# Patient Record
Sex: FEMALE
[De-identification: names, ages, dates, MRNs, and addresses within clinical notes are randomized per-mention and may not be internally consistent; named-entity substitution may affect disease eponyms.]

---

## 2018-09-13 ENCOUNTER — HOSPITAL ENCOUNTER (EMERGENCY)
Dept: HOSPITAL 31 - C.ER | Age: 33
Discharge: HOME | End: 2018-09-13
Payer: COMMERCIAL

## 2018-09-13 VITALS
TEMPERATURE: 98.2 F | SYSTOLIC BLOOD PRESSURE: 101 MMHG | OXYGEN SATURATION: 100 % | DIASTOLIC BLOOD PRESSURE: 65 MMHG | HEART RATE: 62 BPM | RESPIRATION RATE: 16 BRPM

## 2018-09-13 DIAGNOSIS — S81.852A: Primary | ICD-10-CM

## 2018-09-13 DIAGNOSIS — Z23: ICD-10-CM
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
## 2018-09-13 NOTE — C.PDOC
History Of Present Illness





33 year old female presents to ED for evaluation of cat bite to left lower leg 

after cat had been startled today. Patient states she's had the cat under her 

care for 5 years. She is unsure of the cat's vaccination or her own 

vaccinations. Denies fever, chills, nausea, vomiting and other associated 

symptoms. 


 


Time Seen by Provider: 09/13/18 18:54


Chief Complaint (Nursing): Bite


History Per: Patient


History/Exam Limitations: no limitations


Onset/Duration Of Symptoms: Hrs


Current Symptoms Are (Timing): Still Present





Past Medical History


Reviewed: Historical Data, Nursing Documentation, Vital Signs


Vital Signs: 


 Last Vital Signs











Temp  98.2 F   09/13/18 18:41


 


Pulse  62   09/13/18 18:41


 


Resp  16   09/13/18 18:41


 


BP  101/65   09/13/18 18:41


 


Pulse Ox  100   09/13/18 20:15











Family History: States: Unknown Family Hx





- Social History


Hx Alcohol Use: Yes


Hx Substance Use: No





- Immunization History


Hx Tetanus Toxoid Vaccination: Yes (2016)


Hx Influenza Vaccination: No


Hx Pneumococcal Vaccination: No





Review Of Systems


Constitutional: Negative for: Fever, Chills


Gastrointestinal: Negative for: Nausea, Vomiting


Musculoskeletal: Positive for: Leg Pain (left leg cat bite )





Physical Exam





- Physical Exam


Appears: Well, Non-toxic, No Acute Distress


Skin: Warm, Dry, No Rash


Head: Atraumatic, Normacephalic


Extremity: Normal ROM, No Tenderness, No Calf Tenderness, Capillary Refill (

less than 2 seconds ), No Deformity, Other (left leg anterior tibial region 

with superficial cat bite, no laceration, open wound, no discharge or bleeding)


Pulses: Left Femoral: Normal, Right Femoral: Normal


Neurological/Psych: Oriented x3, Normal Speech, Normal Sensation


Gait: Steady





ED Course And Treatment


O2 Sat by Pulse Oximetry: 100 (RA)


Pulse Ox Interpretation: Normal





Medical Decision Making


Medical Decision Making: 





Impression: superficial cat bite to leg





Plan: 


-Augmentin


-Tetanus vaccination 





Progress/Update:


On re-examination, patient is resting comfortably in no acute distress. Patient 

feels comfortable going home and will be discharged. Patient prescribed 

Amoxicillin and informed of follow up instructions. Instructed to return to ER 

if symptoms worsen or new symptoms arise.








Disposition


Counseled Patient/Family Regarding: Diagnosis, Need For Followup, Rx Given





- Disposition


Referrals: 


[facility] [Outside]


[facility]


Disposition: HOME/ ROUTINE


Disposition Time: 19:30


Condition: GOOD


Additional Instructions: 


Take antibiotic twice daily and be sure to finish taking all of antibiotic. 


Follow up for wound check in 2-3 days if wound is getting more red, streaking, 

or pus in wound


Prescriptions: 


Amoxicillin/Clavulanate [Augmentin 875 MG-125 MG] 1 tab PO BID #14 tab


Instructions:  Animal Bites (DC)


Forms:  [facility] (English)





- POA


Present On Arrival: None





- Clinical Impression


Clinical Impression: 


 Cat bite of lower leg








- PA / NP / Resident Statement


MD/DO has reviewed & agrees with the documentation as recorded.





- Scribe Statement


The provider has reviewed the documentation as recorded by the Scribe (Kalyani Keys)





All medical record entries made by the Scribe were at my direction and 

personally dictated by me. I have reviewed the chart and agree that the record 

accurately reflects my personal performance of the history, physical exam, 

medical decision making, and the department course for this patient. I have 

also personally directed, reviewed, and agree with the discharge instructions 

and disposition.